# Patient Record
Sex: FEMALE | Race: WHITE
[De-identification: names, ages, dates, MRNs, and addresses within clinical notes are randomized per-mention and may not be internally consistent; named-entity substitution may affect disease eponyms.]

---

## 2018-04-27 ENCOUNTER — HOSPITAL ENCOUNTER (OUTPATIENT)
Dept: HOSPITAL 17 - PHSDC | Age: 73
Discharge: HOME | End: 2018-04-27
Payer: MEDICAID

## 2018-04-27 VITALS — BODY MASS INDEX: 27.46 KG/M2 | HEIGHT: 66 IN | WEIGHT: 170.86 LBS

## 2018-04-27 VITALS
TEMPERATURE: 97.7 F | OXYGEN SATURATION: 97 % | DIASTOLIC BLOOD PRESSURE: 63 MMHG | RESPIRATION RATE: 16 BRPM | SYSTOLIC BLOOD PRESSURE: 117 MMHG | HEART RATE: 60 BPM

## 2018-04-27 DIAGNOSIS — S83.241A: Primary | ICD-10-CM

## 2018-04-27 DIAGNOSIS — M17.11: ICD-10-CM

## 2018-04-27 DIAGNOSIS — R94.31: ICD-10-CM

## 2018-04-27 PROCEDURE — 93005 ELECTROCARDIOGRAM TRACING: CPT

## 2018-04-27 PROCEDURE — 01400 ANES OPN/ARTHRS KNEE JT NOS: CPT

## 2018-04-27 PROCEDURE — E0113 CRUTCH UNDERARM EACH WOOD: HCPCS

## 2018-04-27 PROCEDURE — 29877 ARTHRS KNEE SURG DBRDMT/SHVG: CPT

## 2018-04-27 NOTE — MP
cc:

Zhang Guzman MD

****

 

 

DATE OF OPERATION:

04/27/2018

 

PREOPERATIVE DIAGNOSIS:

Osteochondral lesion medial femoral condyle, right knee joint.

 

POSTOPERATIVE DIAGNOSIS:

1.  Osteochondral lesion medial femoral condyle, right knee joint.

2.  Partial tearing of the anterior horn of the medial meniscus.

 

PROCEDURE PERFORMED:

Arthroscopic debridement of anterior horn of the medial meniscus with 

chondroplasty of the medial femoral condyle.

 

DETAILS OF PROCEDURE:

The patient was placed on the operating room table in the supine 

position and adequate general anesthesia was administered by Dr. Chauhan

the anesthesiologist.  The patient's right knee was prepped and draped

in usual sterile fashion.  Esmarch bandage was used to exsanguinate 

the right lower extremity after a time-out was called.  An 

anterolateral portal was created for distention of the joint with 

lactated Ringer's solution.  The scope was placed through that portal 

and a systematic examination did reveal fragmentation of the medial 

surface of the medial femoral condyle.  There also appeared to be 

partial tearing of the anterior horn of the medial meniscus with 

fragmentation.  The motorized resector was placed into the joint and a

subtotal resection of the anterior horn was carried out.  The medial 

femoral condyle chondroplasty was then performed using the motorized 

resector down to viable cartilage.  The remainder of the meniscus was 

probed and found to be unremarkable.  The lateral compartment was also

found to be pristine in condition with no evidence of tears or 

posttraumatic disease.  The anterior and posterior cruciate ligaments 

also appeared to be intact.  The patellofemoral joint was also found 

to be unremarkable.

 

All instruments were removed after a thorough irrigation and 

aspiration.  The instruments were removed with the stab wounds being 

repaired with s simple suture of 3-0 nylon.  An intraarticular 

injection through the lateral portal was carried out with 10 mL of 

0.5% Marcaine with 1 mL of 40 mg Kenalog.  Xeroform gauze was applied 

over both wounds and a bulky dressing applied over this.  Tourniquet 

was deflated after 9 minutes.  The procedure was tolerated well and 

the patient went to the recovery room in satisfactory condition.

 

Sponge count, needle counts and instrument counts were reportedly 

correct x 2.

 

 

 

 

__________________________________

Zhang Guzman MD

 

 

[facility]

D: 04/27/2018, 11:45 AM

T: 04/27/2018, 12:02 PM

Visit #: [id number]

Job #: [id number]

## 2018-04-27 NOTE — EKG
Date Performed: 04/27/2018       Time Performed: 08:43:48

 

PTAGE:      72 years

 

EKG:      SINUS BRADYCARDIA BORDERLINE ECG INTERPRETATION BASED ON A DEFAULT AGE OF 39 YEARS 

 

NO PREVIOUS TRACING            

 

DOCTOR:   David Herbert  Interpretating Date/Time  04/27/2018 23:47:20